# Patient Record
Sex: MALE | Race: OTHER | NOT HISPANIC OR LATINO | Employment: FULL TIME | URBAN - METROPOLITAN AREA
[De-identification: names, ages, dates, MRNs, and addresses within clinical notes are randomized per-mention and may not be internally consistent; named-entity substitution may affect disease eponyms.]

---

## 2019-11-05 ENCOUNTER — ANESTHESIA EVENT (OUTPATIENT)
Dept: PERIOP | Facility: HOSPITAL | Age: 64
End: 2019-11-05
Payer: COMMERCIAL

## 2019-11-05 RX ORDER — SODIUM CHLORIDE, SODIUM LACTATE, POTASSIUM CHLORIDE, CALCIUM CHLORIDE 600; 310; 30; 20 MG/100ML; MG/100ML; MG/100ML; MG/100ML
125 INJECTION, SOLUTION INTRAVENOUS CONTINUOUS
Status: CANCELLED | OUTPATIENT
Start: 2019-12-04

## 2019-11-06 ENCOUNTER — TRANSCRIBE ORDERS (OUTPATIENT)
Dept: ADMINISTRATIVE | Facility: HOSPITAL | Age: 64
End: 2019-11-06

## 2019-11-06 ENCOUNTER — HOSPITAL ENCOUNTER (OUTPATIENT)
Dept: NON INVASIVE DIAGNOSTICS | Facility: HOSPITAL | Age: 64
Discharge: HOME | End: 2019-11-06
Attending: ORTHOPAEDIC SURGERY
Payer: COMMERCIAL

## 2019-11-06 ENCOUNTER — HOSPITAL ENCOUNTER (OUTPATIENT)
Dept: RADIOLOGY | Facility: HOSPITAL | Age: 64
Discharge: HOME | End: 2019-11-06
Attending: ORTHOPAEDIC SURGERY
Payer: COMMERCIAL

## 2019-11-06 ENCOUNTER — APPOINTMENT (OUTPATIENT)
Dept: LAB | Facility: HOSPITAL | Age: 64
End: 2019-11-06
Attending: ORTHOPAEDIC SURGERY
Payer: COMMERCIAL

## 2019-11-06 ENCOUNTER — APPOINTMENT (OUTPATIENT)
Dept: PREADMISSION TESTING | Facility: HOSPITAL | Age: 64
End: 2019-11-06
Payer: COMMERCIAL

## 2019-11-06 DIAGNOSIS — Z01.818 OTHER SPECIFIED PRE-OPERATIVE EXAMINATION: ICD-10-CM

## 2019-11-06 DIAGNOSIS — M17.12 OSTEOARTHRITIS OF LEFT KNEE, UNSPECIFIED OSTEOARTHRITIS TYPE: ICD-10-CM

## 2019-11-06 DIAGNOSIS — Z01.818 OTHER SPECIFIED PRE-OPERATIVE EXAMINATION: Primary | ICD-10-CM

## 2019-11-06 LAB
ALBUMIN SERPL BCP-MCNC: 3.5 G/DL (ref 3.5–5)
ALP SERPL-CCNC: 94 U/L (ref 46–116)
ALT SERPL W P-5'-P-CCNC: 42 U/L (ref 12–78)
ANION GAP SERPL CALCULATED.3IONS-SCNC: 9 MMOL/L (ref 4–13)
AST SERPL W P-5'-P-CCNC: 29 U/L (ref 5–45)
BACTERIA UR QL AUTO: ABNORMAL /HPF
BASOPHILS # BLD AUTO: 0.06 THOUSANDS/ΜL (ref 0–0.1)
BASOPHILS NFR BLD AUTO: 1 % (ref 0–1)
BILIRUB SERPL-MCNC: 0.2 MG/DL (ref 0.2–1)
BILIRUB UR QL STRIP: NEGATIVE
BUN SERPL-MCNC: 16 MG/DL (ref 5–25)
CALCIUM SERPL-MCNC: 9.2 MG/DL (ref 8.3–10.1)
CHLORIDE SERPL-SCNC: 102 MMOL/L (ref 100–108)
CLARITY UR: CLEAR
CO2 SERPL-SCNC: 28 MMOL/L (ref 21–32)
COLOR UR: YELLOW
CREAT SERPL-MCNC: 0.95 MG/DL (ref 0.6–1.3)
EOSINOPHIL # BLD AUTO: 0.8 THOUSAND/ΜL (ref 0–0.61)
EOSINOPHIL NFR BLD AUTO: 9 % (ref 0–6)
ERYTHROCYTE [DISTWIDTH] IN BLOOD BY AUTOMATED COUNT: 12.8 % (ref 11.6–15.1)
GFR SERPL CREATININE-BSD FRML MDRD: 83 ML/MIN/1.73SQ M
GLUCOSE SERPL-MCNC: 99 MG/DL (ref 65–140)
GLUCOSE UR STRIP-MCNC: NEGATIVE MG/DL
HCT VFR BLD AUTO: 40.6 % (ref 36.5–49.3)
HGB BLD-MCNC: 13.5 G/DL (ref 12–17)
HGB UR QL STRIP.AUTO: ABNORMAL
IMM GRANULOCYTES # BLD AUTO: 0.05 THOUSAND/UL (ref 0–0.2)
IMM GRANULOCYTES NFR BLD AUTO: 1 % (ref 0–2)
KETONES UR STRIP-MCNC: NEGATIVE MG/DL
LEUKOCYTE ESTERASE UR QL STRIP: NEGATIVE
LYMPHOCYTES # BLD AUTO: 1.76 THOUSANDS/ΜL (ref 0.6–4.47)
LYMPHOCYTES NFR BLD AUTO: 20 % (ref 14–44)
MCH RBC QN AUTO: 31.7 PG (ref 26.8–34.3)
MCHC RBC AUTO-ENTMCNC: 33.3 G/DL (ref 31.4–37.4)
MCV RBC AUTO: 95 FL (ref 82–98)
MONOCYTES # BLD AUTO: 0.81 THOUSAND/ΜL (ref 0.17–1.22)
MONOCYTES NFR BLD AUTO: 9 % (ref 4–12)
NEUTROPHILS # BLD AUTO: 5.25 THOUSANDS/ΜL (ref 1.85–7.62)
NEUTS SEG NFR BLD AUTO: 60 % (ref 43–75)
NITRITE UR QL STRIP: NEGATIVE
NON-SQ EPI CELLS URNS QL MICRO: ABNORMAL /HPF
NRBC BLD AUTO-RTO: 0 /100 WBCS
PH UR STRIP.AUTO: 6 [PH]
PLATELET # BLD AUTO: 313 THOUSANDS/UL (ref 149–390)
PMV BLD AUTO: 9.1 FL (ref 8.9–12.7)
POTASSIUM SERPL-SCNC: 3.9 MMOL/L (ref 3.5–5.3)
PROT SERPL-MCNC: 6.9 G/DL (ref 6.4–8.2)
PROT UR STRIP-MCNC: NEGATIVE MG/DL
RBC # BLD AUTO: 4.26 MILLION/UL (ref 3.88–5.62)
RBC #/AREA URNS AUTO: ABNORMAL /HPF
SODIUM SERPL-SCNC: 139 MMOL/L (ref 136–145)
SP GR UR STRIP.AUTO: 1.02 (ref 1–1.03)
UROBILINOGEN UR QL STRIP.AUTO: 0.2 E.U./DL
WBC # BLD AUTO: 8.73 THOUSAND/UL (ref 4.31–10.16)
WBC #/AREA URNS AUTO: ABNORMAL /HPF

## 2019-11-06 PROCEDURE — 36415 COLL VENOUS BLD VENIPUNCTURE: CPT

## 2019-11-06 PROCEDURE — 81001 URINALYSIS AUTO W/SCOPE: CPT | Performed by: ORTHOPAEDIC SURGERY

## 2019-11-06 PROCEDURE — 80053 COMPREHEN METABOLIC PANEL: CPT

## 2019-11-06 PROCEDURE — 85025 COMPLETE CBC W/AUTO DIFF WBC: CPT

## 2019-11-06 PROCEDURE — 71046 X-RAY EXAM CHEST 2 VIEWS: CPT

## 2019-11-06 RX ORDER — MULTIVITAMIN
1 CAPSULE ORAL DAILY
COMMUNITY

## 2019-11-06 RX ORDER — EZETIMIBE AND SIMVASTATIN 10; 20 MG/1; MG/1
1 TABLET ORAL
COMMUNITY

## 2019-11-06 RX ORDER — ASPIRIN 81 MG/1
81 TABLET ORAL DAILY
COMMUNITY
End: 2019-12-06 | Stop reason: HOSPADM

## 2019-11-06 RX ORDER — LISINOPRIL 40 MG/1
40 TABLET ORAL DAILY
COMMUNITY

## 2019-11-06 NOTE — PRE-PROCEDURE INSTRUCTIONS
Pre-Surgery Instructions:   Medication Instructions    aspirin (ECOTRIN LOW STRENGTH) 81 mg EC tablet Patient was instructed by Physician and understands   bismuth subsalicylate (PEPTO BISMOL) 524 mg/30 mL oral suspension Instructed patient per Anesthesia Guidelines   ezetimibe-simvastatin (VYTORIN) 10-20 mg per tablet Instructed patient per Anesthesia Guidelines   lisinopril (ZESTRIL) 40 mg tablet Instructed patient per Anesthesia Guidelines   Multiple Vitamin (MULTIVITAMIN) capsule Instructed patient per Anesthesia Guidelines   oxyCODONE HCl 5 MG TABA Instructed patient per Anesthesia Guidelines     No meds needed am of surgery per anesthesia DR Luis Kimball,

## 2019-11-06 NOTE — ANESTHESIA PREPROCEDURE EVALUATION
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  Neg cath & ECHO     The left ventricular systolic function is normal   · Patient has normal LV systolic function  · Patient has normal LV end diastolic pressure  · The ejection fraction is greater than 55% by visual estimate  · Patient has mild coronary artery disease  · The left ventricular systolic function is normal   · Patient has normal LV systolic function  ,  Pulmonary  Smoker ex-smoker  ,        GI/Hepatic    GERD (mild) well controlled,        Negative  ROS        Endo/Other  Negative endo/other ROS      GYN  Negative gynecology ROS          Hematology  Negative hematology ROS      Musculoskeletal  Back pain , cervical pain,   Arthritis (S/P R THANH -Saul-last year)     Neurology  Negative neurology ROS      Psychology   Negative psychology ROS              Physical Exam    Airway    Mallampati score: II  TM Distance: >3 FB  Neck ROM: full     Dental   No notable dental hx     Cardiovascular  Rhythm: regular, Rate: normal, Cardiovascular exam normal    Pulmonary  Pulmonary exam normal Breath sounds clear to auscultation,     Other Findings        Anesthesia Plan  ASA Score- 2     Anesthesia Type- spinal with ASA Monitors  Additional Monitors:   Airway Plan:     Comment: Adductor canal block preop        Plan Factors-Patient not instructed to abstain from smoking on day of procedure  Patient did not smoke on day of surgery  Induction- intravenous  Postoperative Plan-     Informed Consent- Anesthetic plan and risks discussed with patient

## 2019-12-04 ENCOUNTER — ANESTHESIA (OUTPATIENT)
Dept: PERIOP | Facility: HOSPITAL | Age: 64
End: 2019-12-04
Payer: COMMERCIAL

## 2019-12-04 ENCOUNTER — HOSPITAL ENCOUNTER (OUTPATIENT)
Facility: HOSPITAL | Age: 64
Setting detail: OUTPATIENT SURGERY
Discharge: HOME | End: 2019-12-06
Attending: ORTHOPAEDIC SURGERY | Admitting: ORTHOPAEDIC SURGERY
Payer: COMMERCIAL

## 2019-12-04 ENCOUNTER — APPOINTMENT (OUTPATIENT)
Dept: RADIOLOGY | Facility: HOSPITAL | Age: 64
End: 2019-12-04
Payer: COMMERCIAL

## 2019-12-04 DIAGNOSIS — M17.12 PRIMARY OSTEOARTHRITIS OF LEFT KNEE: Primary | ICD-10-CM

## 2019-12-04 LAB
ABO GROUP BLD: NORMAL
BLD GP AB SCN SERPL QL: NEGATIVE
RH BLD: POSITIVE
SPECIMEN EXPIRATION DATE: NORMAL

## 2019-12-04 PROCEDURE — G8978 MOBILITY CURRENT STATUS: HCPCS

## 2019-12-04 PROCEDURE — 86850 RBC ANTIBODY SCREEN: CPT | Performed by: ORTHOPAEDIC SURGERY

## 2019-12-04 PROCEDURE — G8979 MOBILITY GOAL STATUS: HCPCS

## 2019-12-04 PROCEDURE — 97163 PT EVAL HIGH COMPLEX 45 MIN: CPT

## 2019-12-04 PROCEDURE — 73560 X-RAY EXAM OF KNEE 1 OR 2: CPT

## 2019-12-04 PROCEDURE — C1776 JOINT DEVICE (IMPLANTABLE): HCPCS | Performed by: ORTHOPAEDIC SURGERY

## 2019-12-04 PROCEDURE — C1713 ANCHOR/SCREW BN/BN,TIS/BN: HCPCS | Performed by: ORTHOPAEDIC SURGERY

## 2019-12-04 PROCEDURE — 86901 BLOOD TYPING SEROLOGIC RH(D): CPT | Performed by: ORTHOPAEDIC SURGERY

## 2019-12-04 PROCEDURE — 86900 BLOOD TYPING SEROLOGIC ABO: CPT | Performed by: ORTHOPAEDIC SURGERY

## 2019-12-04 DEVICE — ATTUNE KNEE SYSTEM TIBIAL BASE ROTATING PLATFORM SIZE 6 CEMENTED
Type: IMPLANTABLE DEVICE | Site: KNEE | Status: FUNCTIONAL
Brand: ATTUNE

## 2019-12-04 DEVICE — SMARTSET HIGH PERFORMANCE MV MEDIUM VISCOSITY BONE CEMENT 40G
Type: IMPLANTABLE DEVICE | Site: KNEE | Status: FUNCTIONAL
Brand: SMARTSET

## 2019-12-04 DEVICE — ATTUNE PATELLA MEDIALIZED ANATOMIC 35MM CEMENTED AOX
Type: IMPLANTABLE DEVICE | Site: KNEE | Status: FUNCTIONAL
Brand: ATTUNE

## 2019-12-04 DEVICE — ATTUNE KNEE SYSTEM FEMORAL POSTERIOR STABILIZED SIZE 6 LEFT CEMENTED
Type: IMPLANTABLE DEVICE | Site: KNEE | Status: FUNCTIONAL
Brand: ATTUNE

## 2019-12-04 DEVICE — ATTUNE KNEE SYSTEM TIBIAL INSERT ROTATING PLATFORM POSTERIOR STABILIZED 6 5MM AOX
Type: IMPLANTABLE DEVICE | Site: KNEE | Status: FUNCTIONAL
Brand: ATTUNE

## 2019-12-04 RX ORDER — OXYCODONE HYDROCHLORIDE 5 MG/1
5 TABLET ORAL EVERY 4 HOURS PRN
Status: DISCONTINUED | OUTPATIENT
Start: 2019-12-04 | End: 2019-12-06 | Stop reason: HOSPADM

## 2019-12-04 RX ORDER — LISINOPRIL 20 MG/1
40 TABLET ORAL DAILY
Status: DISCONTINUED | OUTPATIENT
Start: 2019-12-05 | End: 2019-12-06 | Stop reason: HOSPADM

## 2019-12-04 RX ORDER — ROPIVACAINE HYDROCHLORIDE 5 MG/ML
INJECTION, SOLUTION EPIDURAL; INFILTRATION; PERINEURAL AS NEEDED
Status: DISCONTINUED | OUTPATIENT
Start: 2019-12-04 | End: 2019-12-04 | Stop reason: SURG

## 2019-12-04 RX ORDER — SODIUM CHLORIDE 9 MG/ML
INJECTION, SOLUTION INTRAVENOUS CONTINUOUS PRN
Status: DISCONTINUED | OUTPATIENT
Start: 2019-12-04 | End: 2019-12-04 | Stop reason: SURG

## 2019-12-04 RX ORDER — HYDROMORPHONE HCL/PF 1 MG/ML
0.5 SYRINGE (ML) INJECTION EVERY 2 HOUR PRN
Status: DISCONTINUED | OUTPATIENT
Start: 2019-12-04 | End: 2019-12-06 | Stop reason: HOSPADM

## 2019-12-04 RX ORDER — BUPIVACAINE HYDROCHLORIDE 7.5 MG/ML
INJECTION, SOLUTION INTRASPINAL AS NEEDED
Status: DISCONTINUED | OUTPATIENT
Start: 2019-12-04 | End: 2019-12-04 | Stop reason: SURG

## 2019-12-04 RX ORDER — SODIUM CHLORIDE, SODIUM LACTATE, POTASSIUM CHLORIDE, CALCIUM CHLORIDE 600; 310; 30; 20 MG/100ML; MG/100ML; MG/100ML; MG/100ML
100 INJECTION, SOLUTION INTRAVENOUS CONTINUOUS
Status: DISCONTINUED | OUTPATIENT
Start: 2019-12-04 | End: 2019-12-06 | Stop reason: HOSPADM

## 2019-12-04 RX ORDER — KETOROLAC TROMETHAMINE 30 MG/ML
15 INJECTION, SOLUTION INTRAMUSCULAR; INTRAVENOUS EVERY 6 HOURS PRN
Status: DISCONTINUED | OUTPATIENT
Start: 2019-12-04 | End: 2019-12-06 | Stop reason: HOSPADM

## 2019-12-04 RX ORDER — DOCUSATE SODIUM 100 MG/1
100 CAPSULE, LIQUID FILLED ORAL 2 TIMES DAILY
Status: DISCONTINUED | OUTPATIENT
Start: 2019-12-04 | End: 2019-12-06 | Stop reason: HOSPADM

## 2019-12-04 RX ORDER — HYDRALAZINE HYDROCHLORIDE 20 MG/ML
5 INJECTION INTRAMUSCULAR; INTRAVENOUS EVERY 6 HOURS PRN
Status: DISCONTINUED | OUTPATIENT
Start: 2019-12-04 | End: 2019-12-06 | Stop reason: HOSPADM

## 2019-12-04 RX ORDER — FERROUS SULFATE 325(65) MG
325 TABLET ORAL
Status: DISCONTINUED | OUTPATIENT
Start: 2019-12-05 | End: 2019-12-06 | Stop reason: HOSPADM

## 2019-12-04 RX ORDER — MAGNESIUM HYDROXIDE 1200 MG/15ML
LIQUID ORAL AS NEEDED
Status: DISCONTINUED | OUTPATIENT
Start: 2019-12-04 | End: 2019-12-04 | Stop reason: HOSPADM

## 2019-12-04 RX ORDER — ONDANSETRON 2 MG/ML
4 INJECTION INTRAMUSCULAR; INTRAVENOUS EVERY 8 HOURS PRN
Status: DISCONTINUED | OUTPATIENT
Start: 2019-12-04 | End: 2019-12-06 | Stop reason: HOSPADM

## 2019-12-04 RX ORDER — SENNOSIDES 8.6 MG
1 TABLET ORAL DAILY
Status: DISCONTINUED | OUTPATIENT
Start: 2019-12-04 | End: 2019-12-06 | Stop reason: HOSPADM

## 2019-12-04 RX ORDER — CEFAZOLIN SODIUM 2 G/50ML
2000 SOLUTION INTRAVENOUS ONCE
Status: DISCONTINUED | OUTPATIENT
Start: 2019-12-04 | End: 2019-12-04 | Stop reason: HOSPADM

## 2019-12-04 RX ORDER — CEFAZOLIN SODIUM 1 G/50ML
1000 SOLUTION INTRAVENOUS EVERY 8 HOURS
Status: COMPLETED | OUTPATIENT
Start: 2019-12-04 | End: 2019-12-05

## 2019-12-04 RX ORDER — OXYCODONE HYDROCHLORIDE 5 MG/1
10 TABLET ORAL EVERY 4 HOURS PRN
Status: DISCONTINUED | OUTPATIENT
Start: 2019-12-04 | End: 2019-12-06 | Stop reason: HOSPADM

## 2019-12-04 RX ORDER — CEFAZOLIN SODIUM 2 G/50ML
SOLUTION INTRAVENOUS AS NEEDED
Status: DISCONTINUED | OUTPATIENT
Start: 2019-12-04 | End: 2019-12-04 | Stop reason: SURG

## 2019-12-04 RX ORDER — MIDAZOLAM HYDROCHLORIDE 2 MG/2ML
INJECTION, SOLUTION INTRAMUSCULAR; INTRAVENOUS AS NEEDED
Status: DISCONTINUED | OUTPATIENT
Start: 2019-12-04 | End: 2019-12-04 | Stop reason: SURG

## 2019-12-04 RX ORDER — SODIUM CHLORIDE, SODIUM LACTATE, POTASSIUM CHLORIDE, CALCIUM CHLORIDE 600; 310; 30; 20 MG/100ML; MG/100ML; MG/100ML; MG/100ML
125 INJECTION, SOLUTION INTRAVENOUS CONTINUOUS
Status: DISCONTINUED | OUTPATIENT
Start: 2019-12-04 | End: 2019-12-06 | Stop reason: HOSPADM

## 2019-12-04 RX ORDER — PROPOFOL 10 MG/ML
INJECTION, EMULSION INTRAVENOUS CONTINUOUS PRN
Status: DISCONTINUED | OUTPATIENT
Start: 2019-12-04 | End: 2019-12-04 | Stop reason: SURG

## 2019-12-04 RX ORDER — FENTANYL CITRATE 50 UG/ML
INJECTION, SOLUTION INTRAMUSCULAR; INTRAVENOUS AS NEEDED
Status: DISCONTINUED | OUTPATIENT
Start: 2019-12-04 | End: 2019-12-04 | Stop reason: SURG

## 2019-12-04 RX ORDER — ASPIRIN 325 MG
325 TABLET ORAL 2 TIMES DAILY
Status: DISCONTINUED | OUTPATIENT
Start: 2019-12-04 | End: 2019-12-06 | Stop reason: HOSPADM

## 2019-12-04 RX ORDER — ACETAMINOPHEN 325 MG/1
650 TABLET ORAL EVERY 6 HOURS PRN
Status: DISCONTINUED | OUTPATIENT
Start: 2019-12-04 | End: 2019-12-06 | Stop reason: HOSPADM

## 2019-12-04 RX ORDER — ONDANSETRON 2 MG/ML
4 INJECTION INTRAMUSCULAR; INTRAVENOUS ONCE AS NEEDED
Status: DISCONTINUED | OUTPATIENT
Start: 2019-12-04 | End: 2019-12-04 | Stop reason: HOSPADM

## 2019-12-04 RX ORDER — FENTANYL CITRATE/PF 50 MCG/ML
50 SYRINGE (ML) INJECTION
Status: DISCONTINUED | OUTPATIENT
Start: 2019-12-04 | End: 2019-12-04 | Stop reason: HOSPADM

## 2019-12-04 RX ADMIN — HYDRALAZINE HYDROCHLORIDE 5 MG: 20 INJECTION INTRAMUSCULAR; INTRAVENOUS at 17:40

## 2019-12-04 RX ADMIN — BUPIVACAINE HYDROCHLORIDE IN DEXTROSE 1.8 ML: 7.5 INJECTION, SOLUTION SUBARACHNOID at 10:50

## 2019-12-04 RX ADMIN — EZETIMIBE: 10 TABLET ORAL at 21:32

## 2019-12-04 RX ADMIN — SODIUM CHLORIDE, SODIUM LACTATE, POTASSIUM CHLORIDE, AND CALCIUM CHLORIDE 125 ML/HR: .6; .31; .03; .02 INJECTION, SOLUTION INTRAVENOUS at 09:31

## 2019-12-04 RX ADMIN — HYDROMORPHONE HYDROCHLORIDE 0.5 MG: 1 INJECTION, SOLUTION INTRAMUSCULAR; INTRAVENOUS; SUBCUTANEOUS at 16:22

## 2019-12-04 RX ADMIN — FENTANYL CITRATE 100 MCG: 50 INJECTION, SOLUTION INTRAMUSCULAR; INTRAVENOUS at 10:26

## 2019-12-04 RX ADMIN — MIDAZOLAM 2 MG: 1 INJECTION INTRAMUSCULAR; INTRAVENOUS at 10:26

## 2019-12-04 RX ADMIN — CEFAZOLIN SODIUM 2000 MG: 2 SOLUTION INTRAVENOUS at 10:57

## 2019-12-04 RX ADMIN — DOCUSATE SODIUM 100 MG: 100 CAPSULE, LIQUID FILLED ORAL at 17:41

## 2019-12-04 RX ADMIN — ROPIVACAINE HYDROCHLORIDE 30 ML: 5 INJECTION, SOLUTION EPIDURAL; INFILTRATION; PERINEURAL at 10:29

## 2019-12-04 RX ADMIN — OXYCODONE HYDROCHLORIDE 10 MG: 5 TABLET ORAL at 19:16

## 2019-12-04 RX ADMIN — PROPOFOL 150 MCG/KG/MIN: 10 INJECTION, EMULSION INTRAVENOUS at 10:54

## 2019-12-04 RX ADMIN — HYDROMORPHONE HYDROCHLORIDE 0.5 MG: 1 INJECTION, SOLUTION INTRAMUSCULAR; INTRAVENOUS; SUBCUTANEOUS at 18:29

## 2019-12-04 RX ADMIN — OXYCODONE HYDROCHLORIDE 10 MG: 5 TABLET ORAL at 14:48

## 2019-12-04 RX ADMIN — OXYCODONE HYDROCHLORIDE 10 MG: 5 TABLET ORAL at 23:43

## 2019-12-04 RX ADMIN — SODIUM CHLORIDE: 0.9 INJECTION, SOLUTION INTRAVENOUS at 11:39

## 2019-12-04 RX ADMIN — ASPIRIN 325 MG ORAL TABLET 325 MG: 325 PILL ORAL at 17:41

## 2019-12-04 RX ADMIN — CEFAZOLIN SODIUM 1000 MG: 1 SOLUTION INTRAVENOUS at 18:29

## 2019-12-04 RX ADMIN — HYDROMORPHONE HYDROCHLORIDE 0.5 MG: 1 INJECTION, SOLUTION INTRAMUSCULAR; INTRAVENOUS; SUBCUTANEOUS at 21:16

## 2019-12-04 RX ADMIN — SODIUM CHLORIDE, SODIUM LACTATE, POTASSIUM CHLORIDE, AND CALCIUM CHLORIDE 100 ML/HR: .6; .31; .03; .02 INJECTION, SOLUTION INTRAVENOUS at 13:31

## 2019-12-04 NOTE — PHYSICAL THERAPY NOTE
PHYSICAL THERAPY EVALUATION          Patient Name: Pardeep Burns  [id number] Date: 12/4/2019     75 y o   [id number]    Unilateral primary osteoarthritis, left knee [M17 12]    Past Medical History:   Diagnosis Date    Cervical stenosis of spine     Hyperlipidemia     Hypertension     Mild acid reflux     Myocarditis ([address] )     sept 2019    OA (osteoarthritis)     left knee    Seasonal allergies        Past Surgical History:   Procedure Laterality Date    APPENDECTOMY      COLONOSCOPY      JOINT REPLACEMENT      right TKR    KNEE ARTHROSCOPY Bilateral     VASECTOMY          12/04/19 1605   Note Type   Note type Eval only   Pain Assessment   Pain Assessment 0-10   Pain Score Worst Possible Pain   Pain Type Surgical pain   Pain Location Knee   Pain Orientation Left   Hospital Pain Intervention(s) Cold applied;Repositioned; Ambulation/increased activity; Elevated   Response to Interventions tolerated   Home Living   Type of Home House  (ranch style)   Home Layout One level;Stairs to enter with rails  (3 HUGO)   Bathroom Shower/Tub Tub/shower unit   Bathroom Toilet Standard   Bathroom Accessibility Accessible   Home Equipment Walker;Cane   Prior Function   Level of [place] Independent with ADLs and functional mobility   Lives With Spouse   Receives Help From Family   ADL Assistance Independent   IADLs Independent   Falls in the last 6 months 0   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[employer])   Comments pt prev indep and amb w/o an AD  wife available to assist prn   Restrictions/Precautions   Weight Bearing Precautions Per Order Yes   LLE Weight Bearing Per Order WBAT   Other Precautions WBS; Multiple lines; Fall Risk;Pain   General   Additional Pertinent History s/p L TKA 12/4/19   Family/Caregiver Present Yes  (wife)   Cognition   Overall Cognitive Status [facility]   Arousal/Participation Alert   Orientation Level Oriented X4   Memory Within functional limits   Following Commands Follows all commands and directions without difficulty   RUE Assessment   RUE Assessment WFL   LUE Assessment   LUE Assessment WFL   RLE Assessment   RLE Assessment WFL   LLE Assessment   LLE Assessment X  (knee AROM limited by pain)   Strength LLE   L Knee Extension 3-/5   L Knee Flexion 3-/5   Coordination   Movements are Fluid and Coordinated 0   Sensation WFL   Light Touch   RLE Light Touch Grossly intact   LLE Light Touch Grossly intact   Bed Mobility   Supine to Sit 4  Minimal assistance   Additional items HOB elevated; Bedrails; Increased time required;LE management;Verbal cues   Sit to Supine 4  Minimal assistance   Additional items Assist x 1; Increased time required;LE management;Verbal cues   Transfers   Sit to Stand 4  Minimal assistance   Additional items Assist x 1; Increased time required;Verbal cues   Stand to Sit 4  Minimal assistance   Additional items Assist x 1; Increased time required;Verbal cues   Additional Comments vc for technique and hand placement; use of RW for support   Ambulation/Elevation   Gait pattern Antalgic;Narrow EMMA; Forward Flexion;Decreased foot clearance;Decreased L stance; Short stride; Excessively slow; Step to   Gait Assistance 4  Minimal assist   Additional items Assist x 1;Verbal cues   Assistive Device Rolling walker   Distance 3'   Stair Management Assistance Not tested   Balance   Dynamic Sitting Fair   Static Standing Fair -  (w RW)   Ambulatory Poor +  (w RW)   Endurance Deficit   Endurance Deficit Yes   Endurance Deficit Description pt limited by pain   Activity Tolerance   Activity Tolerance Patient limited by pain   Nurse Made Aware Shalom Montgomery RN; cleared for PT   Assessment   Prognosis Good   Problem List Decreased range of motion;Decreased strength;Decreased endurance; Impaired balance;Decreased mobility; Decreased coordination;Orthopedic restrictions;Decreased skin integrity;Pain   Assessment Michael Echevarria is a 75 y o  male admitted to [facility] on 12/4/2019 for Osteoarthrosis, localized, primary, knee, left  S/p L TKA 12/4/19  Pt  has a past medical history of Cervical stenosis of spine, Hyperlipidemia, Hypertension, Mild acid reflux, Myocarditis ([address] ), OA (osteoarthritis), and Seasonal allergies    PT was consulted and pt was seen on 12/4/2019 for a high complexity PT EVALUATION  Pt presents with monitoring of abn vitals, PIV, incision of L knee, catheterization, WBAT LLE  Pt lives with wife in a ranch style home with 3 HUGO and first floor bed and bath  Prior to admission pt was indep and amb w/o an AD  Pt is currently functioning at a minimum assistance x1 level for bed mobility, minimum assistance x1 level for transfers, minimum assistance x1 level for ambulation with Rolling Walker  Pt demonstrated assist for LLE during bed mobility, verbal cues for hand placement and technique during transfers, decreased stride w step to gait pattern during ambulation  Upon sitting EOB pt reported feeling lightheaded w increased pain; vitals recorded as 191/93  Pt report decrease in sx w seated rest  Pt also instructed on post op HEP including quad sets, heel slides and ankle pumps; pt verbalize understanding  Pt inquiring about Leg lift as he had one follow R TKA in 2018 and thought it was useful to perform bed mobility Indep  Pt will benefit from continued skilled IP PT to address the above mentioned impairments  in order to maximize recovery and increase functional independence when completing mobility and ADLs  Currently PT recommendations for DME include leg lift per pt request  At this time PT recommendations for d/c are home w family support and HHPT  Barriers to Discharge Inaccessible home environment   Barriers to Discharge Comments HUGO   Goals   Patient Goals decrease pain   STG Expiration Date 12/07/19   Short Term Goal #1 To be completed in 3 days: 1)    Pt will perform bed mobility with Jose G demonstrating appropriate technique 100% of the time in order to improve function  2)  Perform all transfers with Jose G demonstrating safe and appropriate technique 100% of the time in order to improve ability to negotiate safely in home environment  3) Amb with least restrictive AD > 200'x1 with mod I in order to demonstrate ability to negotiate in home environment  4)  Improve overall strength and balance 1/2 grade in order to optimize ability to perform functional tasks and reduce fall risk  5) Increase activity tolerance to 45 minutes in order to improve endurance to functional tasks  6)  Negotiate stairs using most appropriate technique and S in order to be able to negotiate safely in home environment  7) PT for ongoing patient and family/caregiver education, DME needs and d/c planning in order to promote highest level of function in least restrictive environment  PT Treatment Day 0   Plan   Treatment/Interventions Functional transfer training;LE strengthening/ROM; Elevations; Therapeutic exercise; Endurance training;Patient/family training;Equipment eval/education;Gait training;Bed mobility; Compensatory technique education;Spoke to nursing   PT Frequency Twice a day;7x/wk; Weekend   Recommendation   Recommendation Home with family support;Home PT   PT - OK to Discharge Yes   Additional Comments when medically cleared   Modified [place] Scale   Modified [place] Scale 4   Barthel Index   Feeding 10   Bathing 0   Grooming Score 5   Dressing Score 5   Bladder Score 0   Bowels Score 10   Toilet Use Score 5   Transfers (Bed/Chair) Score 10   Mobility (Level Surface) Score 0   Stairs Score 0   Barthel Index Score 45   History: co - morbidities, social background (environmental barriers), fall risk, use of assistive device, assist for adl's, multiple lines  Exam: impairments in systems including musculoskeletal (ROM, strength, posture), neuromuscular (balance,locomotion, gait, transfers, motor function and learning), joint integrity, integumentary (skin integrity, presence of scars or wounds), cardiopulmonary, cognition  Clinical: unstable/unpredictable  Complexity:high      Bri Pineda, PT

## 2019-12-04 NOTE — ANESTHESIA PROCEDURE NOTES
Spinal Block    Patient location during procedure: OR  Start time: 12/4/2019 10:50 AM  Reason for block: primary anesthetic  Staffing  Anesthesiologist: Jessica Bruce DO  Performed: anesthesiologist   Preanesthetic Checklist  Completed: patient identified, site marked, surgical consent, pre-op evaluation, timeout performed, IV checked, risks and benefits discussed and monitors and equipment checked  Spinal Block  Patient position: sitting  Prep: Betadine  Patient monitoring: heart rate, continuous pulse ox and frequent blood pressure checks  Approach: midline  Location: L3-4  Injection technique: single-shot  Needle  Needle type: pencil-tip   Needle gauge: 24 G  Needle length: 10 cm  Assessment  Injection Assessment:  positive aspiration for clear CSF, no paresthesia on injection and negative aspiration for heme    Post-procedure:  site cleaned

## 2019-12-04 NOTE — OP NOTE
Left Total Knee Procedure Note    Patient Name: Trino Nguyen  MRN: [id number]  Date of Surgery 12/4/2019    Surgeon: Bailey Larose MD  Assistant: Eric Cervantes [facility]    Indications: Degenerative joint disease left knee with failed conservative care  Pre-operative Diagnosis: Left knee degenerative joint disease  Post-operative Diagnosis: Left knee degenerative joint disease  Anesthesia:  Choice  Operative procedure: Left total knee arthroplasty    Implants:   Implant Name Type Inv  Item Serial No   Lot No  LRB No  Used   CEMENT BONE SMART SET GRAY MED VISC - [id number]  CEMENT BONE SMART SET GRAY MED VISC  DEPUY [id number] Left 1   COMPONENT FEM SZ 6 LT  CMNT PS ATTUNE - [id number]  COMPONENT FEM SZ 6 LT  CMNT PS ATTUNE  DEPUY [id number] Left 1   INSERT TIBIAL 5MM SZ 6 PS ATTUNE - [id number]  INSERT TIBIAL 5MM SZ 6 PS ATTUNE  DEPUY [id number] Left 1   BASEPLATE TIBIAL SZ 6 CMNT ROTPLT ATTUNE KNEE SYSTM - [id number]  BASEPLATE TIBIAL SZ 6 CMNT ROTPLT ATTUNE KNEE SYSTM  DEPUY [id number] Left 1   COMPONENT PATELLAR 35MM CMNT ATTUNE - [id number]  COMPONENT PATELLAR 35MM CMNT ATTUNE  DEPUY [id number] Left 1       Tourniquet time: 41 minutes at 250 mmHg    Drains: none    Estimated blood loss: 75 cc    Antibiotics: Given preoperatively    Clinical note: Trino Nguyen is a 75 y o  male who presents with severe left knee pain and disability  Physical exam investigations was consistent with degenerative joint disease  The patient been treated nonoperatively and failed to improve  Nonoperative versus operative options reviewed  The patient did understand the situation and did wish to proceed with operative management    Description of procedure: The patient was identified as Trino Nguyen and the left knee as the surgical site  Anesthesia was administered  The patient's left lower extremity was elevated and tourniquet applied to the proximal thigh    The left lower extremity was then prepped and free draped in usual fashion for knee surgery  Utilizing an Esmarch bandage, the left lower extremity was stripped followed by inflation of tourniquet  A medial parapatellar approach was made and sharp dissection was carried down through skin and subcutaneous tissue  A medial parapatellar arthrotomy was performed the patella was everted and the knee was flexed  End-stage degenerative changes within the left knee were identified  The cruciate ligament were divided  Utilizing an external tibial cutting guide, the tibial cut was made  The menisci were removed  The femoral cuts were then made utilizing the intramedullary guide system and appropriate cutting jigs  Flexion and extension gaps were found to be satisfactory and the tibia was then machined to accept the tibial component  A trial reduction was carried out and the knee was found to be stable and went through satisfactory range of motion  The patella was cut with freehand technique and appropriately sized followed by placement of a trial component  The patella was noted to track normally and all trial components removed  The knee was then copiously irrigated with saline solution followed by cementing a final components in place starting with the tibia followed by femoral component  The tibial bearing was inserted and knee was held in extension followed by cementing of the patellar component holding it in place with a patellar clamp  Excess cement was removed  The knee was held in extension until the cement cured  The knee was then checked for range of motion and found to be excellent with excellent stability  The tourniquet was released and hemostasis was obtained  2 drains were placed in the wound, one deep and one superficial which would lead to a reinfusion system  The incision was then closed in layers utilizing #1 Vicryl for deep layers, 2-0 Vicryl for subcutaneous closure and a 3-0 Monocryl subcuticular stitch for skin  Steri-Strips were applied  A soft absorbent bandage and Ace wrap was added  The patient be transferred to a stretcher in the supine position and taken to recovery  There were no complications  Throughout the procedure, assistance by Etienne Coleman PA-C was required  She was required to aid in positioning the patient preoperatively and intraoperatively she was required to manipulate the patient's left lower extremity as well as various retractors and other equipment under my guidance  On completion of procedure, she was required to aid in closure of the wound and application of bandage  She was also required to aid in transfer the patient to recovery  AP and lateral views of the left knee were obtained in recovery  This demonstrated appropriate positioning total knee arthroplasty components  There was no evidence loosening or failure  There was air in the soft tissues consistent with immediate postoperative status the radiographs  2 drains were seen within the wound        Jeimy Narayanan MD      Date: 12/4/2019  Time: 12:58 PM

## 2019-12-04 NOTE — ANESTHESIA PROCEDURE NOTES
Shave Biopsy Wound Care    Your doctor has performed a shave biopsy today.  A band aid and vaseline ointment has been placed over the site.  This should remain in place for 24 hours.  It is recommended that you keep the area dry for the first 24 hours.  After 24 hours, you may remove the band aid and wash the area with warm soap and water and apply Vaseline jelly.  Many patients prefer to use Neosporin or Bacitracin ointment.  This is acceptable; however, know that you can develop an allergy to this medication even if you have used it safely for years.  It is important to keep the area moist.  Letting it dry out and get air slows healing time, and will worsen the scar.  Band aid is optional after first 24 hours.      If you notice increasing redness, tenderness, pain, or yellow drainage at the biopsy site, please notify your doctor.  These are signs of an infection.    If your biopsy site is bleeding, apply firm pressure for 15 minutes straight.  Repeat for another 15 minutes, if it is still bleeding.   If the surgical site continues to bleed, then please contact your doctor.      [facility]   DERMATOLOGY  [address]   [place] [address]   Dept: [phone]   Dept Fax: [phone]          Peripheral Block    Patient location during procedure: holding area  Start time: 12/4/2019 10:26 AM  Reason for block: post-op pain management  Staffing  Anesthesiologist: Genia Joseph, DO  Peripheral Block  Patient position: supine  Prep: ChloraPrep  Patient monitoring: heart rate, continuous pulse ox and frequent blood pressure checks  Block type: adductor canal block  Laterality: left  Injection technique: single-shot  Procedures: ultrasound guided, Ultrasound guidance required for the procedure to increase accuracy and safety of medication placement and decrease risk of complications    Ultrasound permanent image saved  Needle  Needle type: Stimuplex   Needle gauge: 21 G  Needle length: 10 cm  Needle localization: anatomical landmarks and ultrasound guidance  Test dose: negative  Assessment  Injection assessment: incremental injection, local visualized surrounding nerve on ultrasound, negative aspiration for heme and no paresthesia on injection  Paresthesia pain: none  Heart rate change: no  Slow fractionated injection: yes  Post-procedure:  site cleaned

## 2019-12-04 NOTE — PLAN OF CARE
Problem: PHYSICAL THERAPY ADULT  Goal: Performs mobility at highest level of function for planned discharge setting  See evaluation for individualized goals  Description  Treatment/Interventions: Functional transfer training, LE strengthening/ROM, Elevations, Therapeutic exercise, Endurance training, Patient/family training, Equipment eval/education, Gait training, Bed mobility, Compensatory technique education, Spoke to nursing          See flowsheet documentation for full assessment, interventions and recommendations  Note:   Prognosis: Good  Problem List: Decreased range of motion, Decreased strength, Decreased endurance, Impaired balance, Decreased mobility, Decreased coordination, Orthopedic restrictions, Decreased skin integrity, Pain  Assessment: Frankey Pleas is a 75 y o  male admitted to [facility] on 12/4/2019 for Osteoarthrosis, localized, primary, knee, left  S/p L TKA 12/4/19  Pt  has a past medical history of Cervical stenosis of spine, Hyperlipidemia, Hypertension, Mild acid reflux, Myocarditis ([address] ), OA (osteoarthritis), and Seasonal allergies    PT was consulted and pt was seen on 12/4/2019 for a high complexity PT EVALUATION  Pt presents with monitoring of abn vitals, PIV, incision of L knee, catheterization, WBAT LLE  Pt lives with wife in a ranch style home with 3 HUGO and first floor bed and bath  Prior to admission pt was indep and amb w/o an AD  Pt is currently functioning at a minimum assistance x1 level for bed mobility, minimum assistance x1 level for transfers, minimum assistance x1 level for ambulation with Rolling Walker  Pt demonstrated assist for LLE during bed mobility, verbal cues for hand placement and technique during transfers, decreased stride w step to gait pattern during ambulation  Upon sitting EOB pt reported feeling lightheaded w increased pain; vitals recorded as 191/93   Pt report decrease in sx w seated rest  Pt also instructed on post op HEP including quad sets, heel slides and ankle pumps; pt verbalize understanding  Pt inquiring about Leg lift as he had one follow R TKA in 2018 and thought it was useful to perform bed mobility Indep  Pt will benefit from continued skilled IP PT to address the above mentioned impairments  in order to maximize recovery and increase functional independence when completing mobility and ADLs  Currently PT recommendations for DME include leg lift per pt request  At this time PT recommendations for d/c are home w family support and HHPT  Barriers to Discharge: Inaccessible home environment  Barriers to Discharge Comments: HUGO  Recommendation: Home with family support, Home PT     PT - OK to Discharge: Yes    See flowsheet documentation for full assessment

## 2019-12-04 NOTE — CONSULTS
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       Recommendation: -    Patient recuperating well following his surgery as expected  He has mild hypertension which is poly factorial but likely due to not taking his medicine this morning  Will resume his lisinopril at his normal dose  If his blood pressure is significantly elevated, greater than 497 systolic then perhaps can try low-dose IV antihypertensives  I have reviewed patients medications as initiated on post operative orders by the primary team  Recommend  monitoring closely for any hypoxemia / respiratory insufficieny related to narcotics and to reduce doses and frequency of narcotics if necessary  Resume patients home medications and I have reviewed them  Maintain Intravenous Fluids for next 24  hours, till patient able to reliably keep meals and meds in  Suggest  Zofran ODT 4 mg sublingually PRN for control of post operative nausea  Patient encouraged to  use Incentive spirometry q 15 minutes while awake to minimize risk of postoperative atelectasis and pneumonia  Patient verbalized to understand and fully comprehend  Recommend DVT prophylaxis with use of Venodyne compression boots  Continue aspirin per primary service  Repeat lab work in the morning to ensure hemoglobin remains stable  If any symptoms of Cardiology related issues then will place him on telemetric evaluation      We will follow this pleasant patient with your service closely and recommend necessary changes based on  further hospital course and diagnostics  Thank you, Dr Barb Ch , for your kind consultation  HISTORY OF PRESENT ILLNESS:    Reason for Consult:  Post operative management for elective left knee replacement    HPI: Lola Barker is a 75 y o  male who underwent elective left knee replacement earlier today by Dr Barb Ch  Patient had a previous right knee replacement without any complications  He has advanced osteoarthritis involving his left knee which has been treated with various conservative treatments and have eventually failed  He was having severe pain 10/10 limiting his activities of daily living  He was planning to undergo surgery when about September of this year he had an episode of chest pain and was diagnosed to have myocarditis  He has been followed by Dr Ember Young at [facility] and was cleared for the surgery on 09/17/2019  Thereafter he also has been followed by his primary care physician and had preoperative evaluation done on 11/12/2019  Currently he is recuperating well without any symptoms or chest pain palpitation diaphoresis  He has no bleeding from his surgical site  Patient has underlying history of hypertension and did not take his lisinopril earlier this morning  Review of Systems   Constitutional:   No appetite change, denies chills, diaphoresis, fatigue or fever  HEENT:  Negative sore throat and tinnitus  No visual complaints  Respiratory:  Negative chest tightness, shortness of breath and wheezing  Cardiovascular:  Negative for chest pain and palpitations  Gastrointestinal: Negative for abdominal distention or pain, constipation, diarrhea and nausea  Endocrine: Negative for cold intolerance, heat intolerance, polydipsia and polyuria  Skin:   Negative for color change, pallor and rash  Neurological:   Negative for dizziness, tremors, seizures, syncope, facial asymmetry, speech difficulty, weakness, light-headedness, numbness and headaches  Hematological:  Musculoskeletal:  Psychiatric:  No h/o spontaneous bruising/bleeding  Joint pains as above  Negative for agitation, behavioral problems, confusion, decreased concentration, dysphoric mood and sleep disturbance  A complete system-based review of systems as discussed with patient is otherwise negative  PAST MEDICAL HISTORY:  Past Medical History:   Diagnosis Date    Cervical stenosis of spine     Hyperlipidemia     Hypertension     Mild acid reflux     Myocarditis ([facility] [address] )     2019    OA (osteoarthritis)     left knee    Seasonal allergies      Past Surgical History:   Procedure Laterality Date    APPENDECTOMY      COLONOSCOPY      JOINT REPLACEMENT      right TKR    KNEE ARTHROSCOPY Bilateral     VASECTOMY         FAMILY HISTORY:  Significant for heart disease in mother, she also suffered with stroke  Father had heart disease and a stroke  One brother had coronary artery bypass grafting  SOCIAL HISTORY:  Social History     Substance and Sexual Activity   Alcohol Use Yes    Frequency: 4 or more times a week    Drinks per session: 3 or 4    Binge frequency: Less than monthly    Comment: beer     Social History     Substance and Sexual Activity   Drug Use Never     Social History     Tobacco Use   Smoking Status Former Smoker    Last attempt to quit: 1987    Years since quittin 0   Smokeless Tobacco Never Used       ALLERGIES:  No Known Allergies    MEDICATIONS:  All current active medications have been reviewed    Current Facility-Administered Medications   Medication Dose Route Frequency Provider Last Rate Last Dose    acetaminophen (TYLENOL) tablet 650 mg  650 mg Oral Q6H PRN Ester Benitez PA-C        aspirin tablet 325 mg  325 mg Oral BID Ester Benitez PA-C        ceFAZolin (ANCEF) IVPB (premix) 1,000 mg  1,000 mg Intravenous Q8H Ester Benitez PA-C        docusate sodium (COLACE) capsule 100 mg  100 mg Oral BID Ester Benitez PA-C       [facility] ezetimibe 10 mg-pravastatin 40 mg combo dose   Oral HS Nehal Santiago MD        [START ON 2019] ferrous sulfate tablet 325 mg  325 mg Oral Daily With Breakfast Wilcox Pop, PA-C        HYDROmorphone (DILAUDID) injection 0 5 mg  0 5 mg Intravenous Q2H PRN Wilcox Pop, PA-C   0 5 mg at 19 1622    ketorolac (TORADOL) injection 15 mg  15 mg Intravenous Q6H PRN Wilcox Pop, PA-C        lactated ringers infusion  100 mL/hr Intravenous Continuous Wilcox Pop, PA-C 100 mL/hr at 19 1331 100 mL/hr at 19 1331    lactated ringers infusion  125 mL/hr Intravenous Continuous Jimmye    Stopped at 19 1139    [START ON 2019] lisinopril (ZESTRIL) tablet 40 mg  40 mg Oral Daily Nehal Santiago MD        ondansetron [facility]) injection 4 mg  4 mg Intravenous Q8H PRN Wilcox Pop, PA-C        oxyCODONE (ROXICODONE) IR tablet 10 mg  10 mg Oral Q4H PRN Wilcox Pop, PA-C   10 mg at 19 1448    oxyCODONE (ROXICODONE) IR tablet 5 mg  5 mg Oral Q4H PRN Wilcox Pop, PA-C        senna (SENOKOT) tablet 8 6 mg  1 tablet Oral Daily Wilcox Pop, PA-C           Medications Prior to Admission   Medication    aspirin (ECOTRIN LOW STRENGTH) 81 mg EC tablet    bismuth subsalicylate (PEPTO BISMOL) 524 mg/30 mL oral suspension    ezetimibe-simvastatin (VYTORIN) 10-20 mg per tablet    lisinopril (ZESTRIL) 40 mg tablet    Multiple Vitamin (MULTIVITAMIN) capsule    oxyCODONE HCl 5 MG TABA           PHYSICAL EXAM:    Vitals:  Temp:  [96 1 °F (35 6 °C)-97 9 °F (36 6 °C)] 97 4 °F (36 3 °C)  HR:  [68-86] 86  Resp:  [15-21] 16  BP: (114-186)/() 186/102  SpO2:  [97 %-100 %] 98 %  Temp (24hrs), Av 3 °F (36 3 °C), Min:96 1 °F (35 6 °C), Max:97 9 °F (36 6 °C)  Current: Temperature: (!) 97 4 °F (36 3 °C)  Body mass index is 28 82 kg/m²  General Appearance:    Awake, alert, cooperative, no distress, appears of stated age     Head:    Normocephalic, without obvious abnormality, atraumatic   Eyes:    Pupils equal in size,conjunctiva/corneas clear, EOM's intact  Ears:    External ear no drainage or redness  Nose:   No evidence of epistaxis/ discharge from nares  Throat:   Lips, mucosa, and tongue normal    Neck:   Supple, symmetrical, trachea midline, no JVP  Back:     Symmetric, no curvature, no CVA tenderness   Lungs:     Bilateral air entry is broncho-alveolar and equal        No crepitation or rales  No pleural rub  Heart:    Regular rate and rhythm, S1S2 normal, no murmur, rub  Abdomen:     Soft, non-tender, no masses, no organomegaly   Musculoskeletal:   Extremities appear normal, atraumatic, no cyanosis or edema  Surgical site on the operated left knee has clear dressing / no bleeding or hemorrhage apparent  Previous well-healed surgical scar helen on the right knee  Bilateral Chalo stockings present   Vascular:   2+ in Posterior tibialis / dorsalis pedis  Skin:   Skin color, texture, turgor normal, no rashes or lesions   Neurologic:   Oriented/ Awake/ facial symmetry maintained  Speech is intact  Muscle bulk and strength is equivocal in B/L Upper and lower extremities except on operated left lower limb  Light sensation is intact B/l LE  B/L Planta flexion is WNL  LABS, IMAGING, & OTHER STUDIES:  Lab Results:  I have personally reviewed pertinent labs  Lab Results   Component Value Date     11/06/2019    CO2 28 11/06/2019    BUN 16 11/06/2019    CREATININE 0 95 11/06/2019    EGFR 83 11/06/2019    CALCIUM 9 2 11/06/2019    AST 29 11/06/2019    ALT 42 11/06/2019    ALKPHOS 94 11/06/2019     Lab Results   Component Value Date    WBC 8 73 11/06/2019    HGB 13 5 11/06/2019     11/06/2019       No results found for: INR, HGBA1C      Imaging Studies:   I have personally reviewed pertinent imaging study reports       Imaging:     Xr Chest Pa & Lateral    Result Date: 11/7/2019  Narrative: CHEST INDICATION:   Z01 [id number]: Encounter for other preprocedural examination  Preop total knee arthroplasty  COMPARISON:  None EXAM PERFORMED/VIEWS:  XR CHEST PA & LATERAL  DUAL ENERGY SUBTRACTION TECHNIQUE FINDINGS: Cardiomediastinal silhouette appears unremarkable  The lungs are clear  No pneumothorax or pleural effusion  Osseous structures appear within normal limits for patient age  Impression: No acute cardiopulmonary disease  Workstation performed: [id number]       EKG, Pathology, and Other Studies:   I have personally reviewed pertinent reports  Reviewed patient's cardiology evaluation including of his visit done on 09/17/2019  Portions of the record may have been created with voice recognition software  Occasional wrong word or "sound a like" substitutions may have occurred due to the inherent limitations of voice recognition software  Read the chart carefully and recognize, using context, where substitutions have occurred

## 2019-12-04 NOTE — ANESTHESIA POSTPROCEDURE EVALUATION
Post-Op Assessment Note    CV Status:  Stable    Pain management: adequate     Mental Status:  Alert and awake   Hydration Status:  Euvolemic   PONV Controlled:  Controlled   Airway Patency:  Patent   Post Op Vitals Reviewed: Yes      Staff: Anesthesiologist           /86 (12/04/19 1515)    Temp (!) 97 4 °F (36 3 °C) (12/04/19 1515)    Pulse 86 (12/04/19 1515)   Resp 16 (12/04/19 1515)    SpO2 98 % (12/04/19 1515)

## 2019-12-04 NOTE — INTERVAL H&P NOTE
H&P reviewed  After examining the patient I find no changes in the patients condition since the H&P had been written      Vitals:    11/06/19 1425   BP: 102/60   Pulse: 83   Resp: 18   Temp: 97 8 °F (36 6 °C)   SpO2: 99%

## 2019-12-05 LAB
ANION GAP SERPL CALCULATED.3IONS-SCNC: 9 MMOL/L (ref 4–13)
BUN SERPL-MCNC: 10 MG/DL (ref 5–25)
CALCIUM SERPL-MCNC: 8.1 MG/DL (ref 8.3–10.1)
CHLORIDE SERPL-SCNC: 101 MMOL/L (ref 100–108)
CO2 SERPL-SCNC: 26 MMOL/L (ref 21–32)
CREAT SERPL-MCNC: 0.84 MG/DL (ref 0.6–1.3)
ERYTHROCYTE [DISTWIDTH] IN BLOOD BY AUTOMATED COUNT: 13.2 % (ref 11.6–15.1)
GFR SERPL CREATININE-BSD FRML MDRD: 91 ML/MIN/1.73SQ M
GLUCOSE SERPL-MCNC: 108 MG/DL (ref 65–140)
HCT VFR BLD AUTO: 35 % (ref 36.5–49.3)
HGB BLD-MCNC: 11.7 G/DL (ref 12–17)
MCH RBC QN AUTO: 31.9 PG (ref 26.8–34.3)
MCHC RBC AUTO-ENTMCNC: 33.4 G/DL (ref 31.4–37.4)
MCV RBC AUTO: 95 FL (ref 82–98)
PLATELET # BLD AUTO: 232 THOUSANDS/UL (ref 149–390)
PMV BLD AUTO: 9.3 FL (ref 8.9–12.7)
POTASSIUM SERPL-SCNC: 3.8 MMOL/L (ref 3.5–5.3)
RBC # BLD AUTO: 3.67 MILLION/UL (ref 3.88–5.62)
SODIUM SERPL-SCNC: 136 MMOL/L (ref 136–145)
WBC # BLD AUTO: 10.29 THOUSAND/UL (ref 4.31–10.16)

## 2019-12-05 PROCEDURE — 80048 BASIC METABOLIC PNL TOTAL CA: CPT | Performed by: PHYSICIAN ASSISTANT

## 2019-12-05 PROCEDURE — 97116 GAIT TRAINING THERAPY: CPT

## 2019-12-05 PROCEDURE — 97110 THERAPEUTIC EXERCISES: CPT

## 2019-12-05 PROCEDURE — 85027 COMPLETE CBC AUTOMATED: CPT | Performed by: PHYSICIAN ASSISTANT

## 2019-12-05 PROCEDURE — 97530 THERAPEUTIC ACTIVITIES: CPT

## 2019-12-05 RX ORDER — PANTOPRAZOLE SODIUM 40 MG/1
40 TABLET, DELAYED RELEASE ORAL
Status: DISCONTINUED | OUTPATIENT
Start: 2019-12-05 | End: 2019-12-06 | Stop reason: HOSPADM

## 2019-12-05 RX ORDER — CELECOXIB 200 MG/1
200 CAPSULE ORAL DAILY
Status: DISCONTINUED | OUTPATIENT
Start: 2019-12-05 | End: 2019-12-06 | Stop reason: HOSPADM

## 2019-12-05 RX ADMIN — PANTOPRAZOLE SODIUM 40 MG: 40 TABLET, DELAYED RELEASE ORAL at 22:09

## 2019-12-05 RX ADMIN — DOCUSATE SODIUM 100 MG: 100 CAPSULE, LIQUID FILLED ORAL at 17:23

## 2019-12-05 RX ADMIN — OXYCODONE HYDROCHLORIDE 10 MG: 5 TABLET ORAL at 22:09

## 2019-12-05 RX ADMIN — FERROUS SULFATE TAB 325 MG (65 MG ELEMENTAL FE) 325 MG: 325 (65 FE) TAB at 08:21

## 2019-12-05 RX ADMIN — ASPIRIN 325 MG ORAL TABLET 325 MG: 325 PILL ORAL at 08:21

## 2019-12-05 RX ADMIN — CEFAZOLIN SODIUM 1000 MG: 1 SOLUTION INTRAVENOUS at 03:06

## 2019-12-05 RX ADMIN — CELECOXIB 200 MG: 200 CAPSULE ORAL at 08:34

## 2019-12-05 RX ADMIN — EZETIMIBE: 10 TABLET ORAL at 22:09

## 2019-12-05 RX ADMIN — SENNOSIDES 8.6 MG: 8.6 TABLET, FILM COATED ORAL at 08:21

## 2019-12-05 RX ADMIN — HYDRALAZINE HYDROCHLORIDE 5 MG: 20 INJECTION INTRAMUSCULAR; INTRAVENOUS at 11:57

## 2019-12-05 RX ADMIN — DOCUSATE SODIUM 100 MG: 100 CAPSULE, LIQUID FILLED ORAL at 08:21

## 2019-12-05 RX ADMIN — OXYCODONE HYDROCHLORIDE 10 MG: 5 TABLET ORAL at 10:59

## 2019-12-05 RX ADMIN — KETOROLAC TROMETHAMINE 15 MG: 30 INJECTION, SOLUTION INTRAMUSCULAR at 01:29

## 2019-12-05 RX ADMIN — HYDROMORPHONE HYDROCHLORIDE 0.5 MG: 1 INJECTION, SOLUTION INTRAMUSCULAR; INTRAVENOUS; SUBCUTANEOUS at 08:21

## 2019-12-05 RX ADMIN — OXYCODONE HYDROCHLORIDE 10 MG: 5 TABLET ORAL at 16:04

## 2019-12-05 RX ADMIN — OXYCODONE HYDROCHLORIDE 10 MG: 5 TABLET ORAL at 05:41

## 2019-12-05 RX ADMIN — ASPIRIN 325 MG ORAL TABLET 325 MG: 325 PILL ORAL at 17:23

## 2019-12-05 RX ADMIN — LISINOPRIL 40 MG: 20 TABLET ORAL at 08:21

## 2019-12-05 NOTE — PLAN OF CARE
Problem: PHYSICAL THERAPY ADULT  Goal: Performs mobility at highest level of function for planned discharge setting  See evaluation for individualized goals  Description  Treatment/Interventions: Functional transfer training, LE strengthening/ROM, Elevations, Therapeutic exercise, Endurance training, Patient/family training, Equipment eval/education, Gait training, Bed mobility, Compensatory technique education, Spoke to nursing          See flowsheet documentation for full assessment, interventions and recommendations  12/5/2019 1545 by Dario Durham PTA  Outcome: Progressing  Note:   Prognosis: Good  Problem List: Decreased strength, Decreased range of motion, Decreased endurance, Impaired balance, Decreased mobility, Decreased skin integrity, Pain, Orthopedic restrictions  Assessment: Pt  supine in bed upon my arrival  Pt  reporting fatigue, however agreeable to therapeutic intervention  BP measured supine at 167/96  Performance of HEP supine in bed with cues provided for proper completion  Progressed with transfers being able to complete practicing proper technique with no noted LOB  Taken to steps via chair transport  Progressed to stair training, being able to complete practicing proper technique with no noted LOB  Pt  continued with an increased amb  trial with eventual return to room  Pt  repositioned supine in bed with ice applied at end of treatment session  RN aware of pt's BP at end of treatment session  PT will continue to recommend d/c home with family support and HHPT provided when medically stable  Barriers to Discharge: None  Barriers to Discharge Comments: HUGO  Recommendation: Home PT, Home with family support     PT - OK to Discharge: Yes(if d/c when medically stable )    See flowsheet documentation for full assessment       12/5/2019 1228 by Dario Durham PTA  Outcome: Progressing  Note:   Prognosis: Good  Problem List: Decreased range of motion, Decreased strength, Decreased endurance, Decreased mobility, Impaired balance, Decreased skin integrity, Orthopedic restrictions, Pain  Assessment: Pt  supine in bed upon my arrival  Pt  reporting pain/fatigue, however agreeable to therapeutic intervention  Performance of HEP supine in bed with cues provided for proper completion  BP measured supine at 186/67  Progressed with transfers being able to complete practicing proper technique with no noted LOB  BP measured seated at EOB at 185/88  Performance of limited amb  trial due to elevated BP  With use of RW and standbyA of therapist with cues provided for LE sequencing  Able to complete reciprocal gait pattern with amb  trial  Pt  returned to supine in bed with ice applied at end of treatment session  Pt  will continue progression of PT goals with intent of d/c home with HHPT and family support as needed when medically stable  RN aware of BP measurements at end of treatment session  Barriers to Discharge: Inaccessible home environment  Barriers to Discharge Comments: HUGO  Recommendation: Home PT, Home with family support     PT - OK to Discharge: No(Continued progression of PT goals prior to d/c )    See flowsheet documentation for full assessment

## 2019-12-05 NOTE — PROGRESS NOTES
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  8  Ambulatory dysfunction      Recommendation:    Patient is generally recuperating very well, continues to use analgesics p r n  To elevate his symptoms  He is likely to be discharged tomorrow  He has been are us to use ice pack on the operated left knee to minimize any swelling  He did receive p r n  Hydralazine as his blood pressure was elevated 1 time  We discussed use of beta-blockers which she had used in the past following his episode of myocarditis but that was discontinued by his Cardiology  Patient's wife who was present indicates that generally his pressure runs very good and then not very keen on adding any new medicine at present  They have agreed that if his pressure still continues to persistently remain elevated requiring IV antihypertensives then it would be prudent to consider adding beta blockers to his Ace inhibitors  Continue DVT prophylaxis incentive spirometry full fall precautions  May take proton pump inhibitors for his current symptoms  Recommend low-sodium diet  Subjective:     Patient seen and examined today  EMR and overnight events reviewed  Patient is resting comfortably and his wife was in the room  He had mild pain in his operated left knee  He was not using ice packs around the surgical site  Denies any bleeding  Denies any chest pain palpitation diaphoresis  Denies any urinary complaints  Noted and discussed with the nursing staff that his pressure was slightly elevated requiring IV hydralazine 1 time  Denies any associated headache paresthesias or tingling  Review of Systems     Constitutional: No chills, diaphoresis, fatigue or fever  HEENT: No sore throat  No visual complaints  Respiratory: No chest tightness, shortness of breath and wheezing  Cardiovascular: No chest pain and palpitations  No Syncope or grey out  GI: Negative for abdominal distention, pain, constipation, diarrhea or nausea  Endocrine: Negative for cold or heat intolerance, polydipsia and polyuria  Genitourinary: Negative for decreased urine volume, dysuria, flank pain and urgency  Skin: Negative for rash  Neurological: Negative for dizziness, weakness, numbness and headaches  Hematological: Negative for spontaneous bruising or bleeding  Psychiatric: Negative for confusion, dysphoric mood, hallucinations  All other systems reviewed and are negative  OBJECTIVE:     Vitals:     Temp:  [99 4 °F (37 4 °C)-100 8 °F (38 2 °C)] 99 7 °F (37 6 °C)  HR:  [] 108  Resp:  [16-20] 19  BP: (132-185)/(71-98) 152/91  SpO2:  [95 %-97 %] 97 %  Temp (24hrs), Av 9 °F (37 7 °C), Min:99 4 °F (37 4 °C), Max:100 8 °F (38 2 °C)  Current: Temperature: 99 7 °F (37 6 °C)    Intake/Output Summary (Last 24 hours) at 2019 1852  Last data filed at 2019 1210  Gross per 24 hour   Intake    Output 3475 ml   Net -3475 ml     Body mass index is 28 82 kg/m²  Physical Exam    Awake and alert cooperative  Pallor JVP cyanosis negative  Bilateral lungs are clear no rales or crackles  S1-S2 regular no gallops no murmurs  No pericardial rub  Abdomen is soft without tenderness or guarding  Neurological intact good hand grasp and plantar flexion  Operated left knee well-approximated surgical scar  Slight edema and warm to touch particularly on the medial aspect    Patient not using ice pack as has been recommended  Bilateral Homans signs are negative  Distal pulses are intact in posterior tibialis dorsalis pedis  Skin dry warm no rashes  Mood fair and stable      Labs, Imaging, & Other studies:    All pertinent labs and imaging studies were personally reviewed  Results from last 7 days   Lab Units 12/05/19  0428   WBC Thousand/uL 10 29*   HEMOGLOBIN g/dL 11 7*   PLATELETS Thousands/uL 232     Results from last 7 days   Lab Units 12/05/19  0428   POTASSIUM mmol/L 3 8   CHLORIDE mmol/L 101   CO2 mmol/L 26   BUN mg/dL 10   CREATININE mg/dL 0 84   EGFR ml/min/1 73sq m 91   CALCIUM mg/dL 8 1*           Current Meds:  Current Facility-Administered Medications   Medication Dose Route Frequency Provider Last Rate Last Dose    acetaminophen (TYLENOL) tablet 650 mg  650 mg Oral Q6H PRN LAURA Wang-ARGELIA        aspirin tablet 325 mg  325 mg Oral BID LAURA Wang-C   325 mg at 12/05/19 1723    celecoxib (CeleBREX) capsule 200 mg  200 mg Oral Daily ANN WangC   200 mg at 12/05/19 0095    docusate sodium (COLACE) capsule 100 mg  100 mg Oral BID ANN WangC   100 mg at 12/05/19 1723    ezetimibe 10 mg-pravastatin 40 mg combo dose   Oral HS Kirti Iverson MD        ferrous sulfate tablet 325 mg  325 mg Oral Daily With Breakfast ANN WangC   325 mg at 12/05/19 6120    hydrALAZINE (APRESOLINE) injection 5 mg  5 mg Intravenous Q6H PRN Kirti Iverson MD   5 mg at 12/05/19 1157    HYDROmorphone (DILAUDID) injection 0 5 mg  0 5 mg Intravenous Q2H PRN Elijah Arreaga PA-C   0 5 mg at 12/05/19 0821    ketorolac (TORADOL) injection 15 mg  15 mg Intravenous Q6H PRN LAURA Wang-C   15 mg at 12/05/19 0129    lactated ringers infusion  100 mL/hr Intravenous Continuous ANN WangC 100 mL/hr at 12/04/19 1331 100 mL/hr at 12/04/19 1331    lactated ringers infusion  125 mL/hr Intravenous Continuous Litzy Angela DO   Stopped at 12/04/19 1139    lisinopril (ZESTRIL) tablet 40 mg  40 mg Oral Daily Kirti Iverson MD   40 mg at 12/05/19 0821    ondansetron (ZOFRAN) injection 4 mg  4 mg Intravenous Q8H PRN Dellie Warren, PA-C        oxyCODONE (ROXICODONE) IR tablet 10 mg  10 mg Oral Q4H PRN Dellie Warren, PA-C   10 mg at 12/05/19 1604    oxyCODONE (ROXICODONE) IR tablet 5 mg  5 mg Oral Q4H PRN Dellie Warren, PA-C        senna (SENOKOT) tablet 8 6 mg  1 tablet Oral Daily Jw , PA-C   8 6 mg at 12/05/19 8283     Home Meds:  Medications Prior to Admission   Medication    aspirin (ECOTRIN LOW STRENGTH) 81 mg EC tablet    bismuth subsalicylate (PEPTO BISMOL) 524 mg/30 mL oral suspension    ezetimibe-simvastatin (VYTORIN) 10-20 mg per tablet    lisinopril (ZESTRIL) 40 mg tablet    Multiple Vitamin (MULTIVITAMIN) capsule    oxyCODONE HCl 5 MG TABA       Continuous Infusions:    lactated ringers 100 mL/hr Last Rate: 100 mL/hr (12/04/19 1331)   lactated ringers 125 mL/hr Last Rate: Stopped (12/04/19 1139)       Invasive Devices     Peripheral Intravenous Line            Peripheral IV 12/05/19 Right Forearm less than 1 day                VTE Pharmacologic Prophylaxis: ASA BID as per Primary Ortho Team   VTE Mechanical Prophylaxis: sequential compression device    Portions of the record may have been created with voice recognition software  Occasional wrong word or "sound a like" substitutions may have occurred due to the inherent limitations of voice recognition software  Read the chart carefully and recognize, using context, where substitutions have occurred

## 2019-12-05 NOTE — PROGRESS NOTES
Orthopedic Total Knee Progress Note  ([facility])    ASSESSMENT & PLAN:  Status post- LEFT total knee arthroplasty:  LOS: 0 days    Doing well postoperatively  Pain Relief: Pt comfortable and pain controlled  Continues current post-op course  Activity: up with assistance  Working with PT / OT  Weight Bearing: WBAT      SUBJECTIVE:    Systemic or Specific Complaints: Pain medications covering pain  OBJECTIVE:  Vital signs in last 24 hours:  Temp:  [96 1 °F (35 6 °C)-100 8 °F (38 2 °C)] 99 7 °F (37 6 °C)  HR:  [] 103  Resp:  [15-21] 18  BP: (114-186)/() 172/93  General: alert, appears stated age and cooperative   Neurovascular: Intact    Wound: No Erythema and Wound Intact   Range of Motion: Normal for post surgery   DVT Exam: Negative Phuong's sign  No cords or calf tenderness  No significant calf/ankle edema       Data Review:  CBC:   Lab Results   Component Value Date    WBC 10 29 (H) 12/05/2019    RBC 3 67 (L) 12/05/2019    HGB 11 7 (L) 12/05/2019    HCT 35 0 (L) 12/05/2019     12/05/2019     Plan:  DVT Prophylaxis -  mg BID x 6 weeks  Mobilize with PT / OT  D/C Planning for Disposition  Will add celebrex for pain    Deniz Cuevas PA-C  Date: 12/5/2019  Time: 8:10 AM

## 2019-12-05 NOTE — PHYSICAL THERAPY NOTE
Physical Therapy Progress Note     12/05/19 1213   Pain Assessment   Pain Assessment 0-10   Pain Score 6   Pain Type Surgical pain   Pain Location Knee   Pain Orientation Left   Hospital Pain Intervention(s) Ambulation/increased activity;Repositioned;Cold applied   Response to Interventions Tolerated  Restrictions/Precautions   Weight Bearing Precautions Per Order Yes   LLE Weight Bearing Per Order WBAT   Other Precautions WBS; Fall Risk;Pain   General   Chart Reviewed Yes   Response to Previous Treatment Patient reporting fatigue but able to participate  Family/Caregiver Present No   Subjective   Subjective Willing to participate in therapy this PM    Bed Mobility   Supine to Sit 5  Supervision   Additional items Assist x 1;HOB elevated; Bedrails;Leg ; Increased time required;Verbal cues;LE management   Sit to Supine 5  Supervision   Additional items Assist x 1;Bedrails;Leg ; Increased time required;Verbal cues;LE management   Transfers   Sit to Stand 5  Supervision   Additional items Assist x 1;Bedrails; Increased time required;Verbal cues   Stand to Sit 5  Supervision   Additional items Assist x 1;Bedrails; Increased time required;Verbal cues   Ambulation/Elevation   Gait pattern Decreased foot clearance; Antalgic; Improper Weight shift;Decreased L stance; Short stride; Excessively slow; Inconsistent nichole  (reciprocal gait pattern)   Gait Assistance 5  Supervision   Additional items Assist x 1;Verbal cues; Tactile cues   Assistive Device Rolling walker   Distance 50'   Balance   Static Sitting Fair   Dynamic Sitting Fair   Static Standing Fair -   Dynamic Standing Fair -   Ambulatory Fair -   Endurance Deficit   Endurance Deficit Yes   Endurance Deficit Description medical/pain/fatigue   Activity Tolerance   Activity Tolerance Patient limited by fatigue;Patient limited by pain   Nurse Made Aware Yes   Exercises   TKR Supine;10 reps;AAROM; Bilateral   Assessment   Prognosis Good   Problem List Decreased range of motion;Decreased strength;Decreased endurance;Decreased mobility; Impaired balance;Decreased skin integrity;Orthopedic restrictions;Pain   Assessment Pt  supine in bed upon my arrival  Pt  reporting pain/fatigue, however agreeable to therapeutic intervention  Performance of HEP supine in bed with cues provided for proper completion  BP measured supine at 186/67  Progressed with transfers being able to complete practicing proper technique with no noted LOB  BP measured seated at EOB at 185/88  Performance of limited amb  trial due to elevated BP  With use of RW and standbyA of therapist with cues provided for LE sequencing  Able to complete reciprocal gait pattern with amb  trial  Pt  returned to supine in bed with ice applied at end of treatment session  Pt  will continue progression of PT goals with intent of d/c home with HHPT and family support as needed when medically stable  RN aware of BP measurements at end of treatment session  Barriers to Discharge Inaccessible home environment   Barriers to Discharge Comments HUGO   Goals   Patient Goals To get better  STG Expiration Date 12/07/19   PT Treatment Day 1   Plan   Treatment/Interventions Functional transfer training;LE strengthening/ROM; Therapeutic exercise; Endurance training;Bed mobility;Gait training;Spoke to nursing;Spoke to case management   Progress Progressing toward goals   PT Frequency 7x/wk; Twice a day;Weekend   Recommendation   Recommendation Home PT; Home with family support   Equipment Recommended Other (Comment)  (has DME at home  )   PT - OK to Discharge No  (Continued progression of PT goals prior to d/c )     Maine Cuevas, PTA

## 2019-12-05 NOTE — PHYSICAL THERAPY NOTE
Physical Therapy Progress Note     12/05/19 1517   Pain Assessment   Pain Assessment 0-10   Pain Score 8   Pain Type Surgical pain   Pain Location Knee   Pain Orientation Left   Hospital Pain Intervention(s) Ambulation/increased activity;Repositioned;Cold applied   Response to Interventions Tolerated  Restrictions/Precautions   Weight Bearing Precautions Per Order Yes   LLE Weight Bearing Per Order WBAT   Other Precautions WBS; Fall Risk;Pain   General   Chart Reviewed Yes   Response to Previous Treatment Patient reporting fatigue but able to participate  Family/Caregiver Present No   Subjective   Subjective Willing to participate in therapy this PM    Bed Mobility   Supine to Sit 5  Supervision   Additional items Assist x 1;HOB elevated; Bedrails;Leg ; Increased time required;LE management;Verbal cues   Sit to Supine 5  Supervision   Additional items Assist x 1;Bedrails;Leg ; Increased time required;Verbal cues;LE management   Transfers   Sit to Stand 5  Supervision   Additional items Assist x 1;Bedrails; Increased time required;Verbal cues   Stand to Sit 5  Supervision   Additional items Assist x 1;Bedrails; Increased time required;Verbal cues   Car transfer 5  Supervision   Additional items Assist x 1; Armrests; Increased time required;Verbal cues   Ambulation/Elevation   Gait pattern Decreased foot clearance; Forward Flexion; Short stride; Excessively slow; Inconsistent nichole;Decreased L stance; Antalgic  (reciprocal gait pattern)   Gait Assistance 5  Supervision   Additional items Assist x 1;Verbal cues; Tactile cues   Assistive Device Rolling walker   Distance 100'   Stair Management Assistance 5  Supervision   Additional items Assist x 1; Tactile cues; Verbal cues   Stair Management Technique Two rails; Step to pattern; Foreward;Nonreciprocal   Number of Stairs 3   Balance   Static Sitting Fair   Dynamic Sitting Fair   Static Standing Fair   Dynamic Standing Fair   Ambulatory Fair -   Endurance Deficit Endurance Deficit No   Activity Tolerance   Activity Tolerance Patient tolerated treatment well   Nurse Made Aware Yes   Exercises   TKR Supine;10 reps;AAROM; Bilateral   Assessment   Prognosis Good   Problem List Decreased strength;Decreased range of motion;Decreased endurance; Impaired balance;Decreased mobility; Decreased skin integrity;Pain;Orthopedic restrictions   Assessment Pt  supine in bed upon my arrival  Pt  reporting fatigue, however agreeable to therapeutic intervention  BP measured supine at 167/96  Performance of HEP supine in bed with cues provided for proper completion  Progressed with transfers being able to complete practicing proper technique with no noted LOB  Taken to steps via chair transport  Progressed to stair training, being able to complete practicing proper technique with no noted LOB  Pt  continued with an increased amb  trial with eventual return to room  Pt  repositioned supine in bed with ice applied at end of treatment session  RN aware of pt's BP at end of treatment session  PT will continue to recommend d/c home with family support and HHPT provided when medically stable  Barriers to Discharge None   Goals   Patient Goals To go home  STG Expiration Date 12/07/19   PT Treatment Day 2   Plan   Treatment/Interventions Functional transfer training;LE strengthening/ROM; Therapeutic exercise;Elevations; Endurance training;Gait training;Bed mobility;Spoke to nursing;Spoke to case management   Progress Progressing toward goals   PT Frequency 7x/wk; Twice a day;Weekend   Recommendation   Recommendation Home PT; Home with family support   Equipment Recommended Other (Comment)  (has DME at home  )   PT - OK to Discharge Yes  (if d/c when medically stable )     Maine Cuevas, PTA

## 2019-12-05 NOTE — SOCIAL WORK
CM met with pt at bedside to discuss discharge needs  Pt lives in a ranch style home with his wife  ADL's are completed independently  Pt owns a RW; declined BSC  Pt go home with HHPT; Requests to use [facility]  CM will submit a referral for HHPT/SN  Pt does drive and will have a ride home at D/C  Pt would like to use [facility]  No other needs expressed or identified  CM will continue to follow as needed  A post acute care recommendation was made by your care team for [facility]  Discussed Freedom of Choice with patient  List of agencies given to patient via in person  patient aware the list is custom filtered for them by zip code location and that [facility] post acute providers are designated

## 2019-12-05 NOTE — PLAN OF CARE
Problem: PHYSICAL THERAPY ADULT  Goal: Performs mobility at highest level of function for planned discharge setting  See evaluation for individualized goals  Description  Treatment/Interventions: Functional transfer training, LE strengthening/ROM, Elevations, Therapeutic exercise, Endurance training, Patient/family training, Equipment eval/education, Gait training, Bed mobility, Compensatory technique education, Spoke to nursing          See flowsheet documentation for full assessment, interventions and recommendations  Outcome: Progressing  Note:   Prognosis: Good  Problem List: Decreased range of motion, Decreased strength, Decreased endurance, Decreased mobility, Impaired balance, Decreased skin integrity, Orthopedic restrictions, Pain  Assessment: Pt  supine in bed upon my arrival  Pt  reporting pain/fatigue, however agreeable to therapeutic intervention  Performance of HEP supine in bed with cues provided for proper completion  BP measured supine at 186/67  Progressed with transfers being able to complete practicing proper technique with no noted LOB  BP measured seated at EOB at 185/88  Performance of limited amb  trial due to elevated BP  With use of RW and standbyA of therapist with cues provided for LE sequencing  Able to complete reciprocal gait pattern with amb  trial  Pt  returned to supine in bed with ice applied at end of treatment session  Pt  will continue progression of PT goals with intent of d/c home with HHPT and family support as needed when medically stable  RN aware of BP measurements at end of treatment session  Barriers to Discharge: Inaccessible home environment  Barriers to Discharge Comments: HUGO  Recommendation: Home PT, Home with family support     PT - OK to Discharge: No(Continued progression of PT goals prior to d/c )    See flowsheet documentation for full assessment

## 2019-12-06 VITALS
RESPIRATION RATE: 18 BRPM | HEIGHT: 67 IN | DIASTOLIC BLOOD PRESSURE: 80 MMHG | HEART RATE: 99 BPM | TEMPERATURE: 98.1 F | OXYGEN SATURATION: 98 % | BODY MASS INDEX: 28.88 KG/M2 | WEIGHT: 184 LBS | SYSTOLIC BLOOD PRESSURE: 130 MMHG

## 2019-12-06 LAB
ANION GAP SERPL CALCULATED.3IONS-SCNC: 8 MMOL/L (ref 4–13)
BUN SERPL-MCNC: 7 MG/DL (ref 5–25)
CALCIUM SERPL-MCNC: 8.5 MG/DL (ref 8.3–10.1)
CHLORIDE SERPL-SCNC: 102 MMOL/L (ref 100–108)
CO2 SERPL-SCNC: 28 MMOL/L (ref 21–32)
CREAT SERPL-MCNC: 0.73 MG/DL (ref 0.6–1.3)
GFR SERPL CREATININE-BSD FRML MDRD: 97 ML/MIN/1.73SQ M
GLUCOSE SERPL-MCNC: 124 MG/DL (ref 65–140)
POTASSIUM SERPL-SCNC: 3.5 MMOL/L (ref 3.5–5.3)
SODIUM SERPL-SCNC: 138 MMOL/L (ref 136–145)

## 2019-12-06 PROCEDURE — 80048 BASIC METABOLIC PNL TOTAL CA: CPT | Performed by: PHYSICIAN ASSISTANT

## 2019-12-06 PROCEDURE — 97110 THERAPEUTIC EXERCISES: CPT

## 2019-12-06 PROCEDURE — 97530 THERAPEUTIC ACTIVITIES: CPT

## 2019-12-06 PROCEDURE — 97116 GAIT TRAINING THERAPY: CPT

## 2019-12-06 RX ORDER — OXYCODONE HYDROCHLORIDE 5 MG/1
5 TABLET ORAL EVERY 4 HOURS PRN
Qty: 30 TABLET | Refills: 0
Start: 2019-12-06 | End: 2019-12-06 | Stop reason: HOSPADM

## 2019-12-06 RX ORDER — FERROUS SULFATE 325(65) MG
325 TABLET ORAL
Refills: 0
Start: 2019-12-06

## 2019-12-06 RX ORDER — ACETAMINOPHEN 325 MG/1
650 TABLET ORAL EVERY 6 HOURS PRN
Qty: 30 TABLET | Refills: 0
Start: 2019-12-06

## 2019-12-06 RX ORDER — SENNOSIDES 8.6 MG
1 TABLET ORAL
Qty: 120 EACH | Refills: 0
Start: 2019-12-06

## 2019-12-06 RX ORDER — ASPIRIN 325 MG
325 TABLET ORAL 2 TIMES DAILY
Refills: 0
Start: 2019-12-06

## 2019-12-06 RX ORDER — DOCUSATE SODIUM 100 MG/1
100 CAPSULE, LIQUID FILLED ORAL 2 TIMES DAILY
Qty: 10 CAPSULE | Refills: 0
Start: 2019-12-06

## 2019-12-06 RX ORDER — OXYCODONE HYDROCHLORIDE 10 MG/1
10 TABLET ORAL EVERY 4 HOURS PRN
Qty: 30 TABLET | Refills: 0
Start: 2019-12-06 | End: 2019-12-16

## 2019-12-06 RX ADMIN — LISINOPRIL 40 MG: 20 TABLET ORAL at 09:12

## 2019-12-06 RX ADMIN — OXYCODONE HYDROCHLORIDE 10 MG: 5 TABLET ORAL at 09:16

## 2019-12-06 RX ADMIN — PANTOPRAZOLE SODIUM 40 MG: 40 TABLET, DELAYED RELEASE ORAL at 05:29

## 2019-12-06 RX ADMIN — ASPIRIN 325 MG ORAL TABLET 325 MG: 325 PILL ORAL at 09:11

## 2019-12-06 RX ADMIN — DOCUSATE SODIUM 100 MG: 100 CAPSULE, LIQUID FILLED ORAL at 09:11

## 2019-12-06 RX ADMIN — SENNOSIDES 8.6 MG: 8.6 TABLET, FILM COATED ORAL at 09:11

## 2019-12-06 RX ADMIN — CELECOXIB 200 MG: 200 CAPSULE ORAL at 09:12

## 2019-12-06 RX ADMIN — FERROUS SULFATE TAB 325 MG (65 MG ELEMENTAL FE) 325 MG: 325 (65 FE) TAB at 09:12

## 2019-12-06 NOTE — PHYSICAL THERAPY NOTE
Physical Therapy Progress Note     12/06/19 0910   Pain Assessment   Pain Assessment 0-10   Pain Score 7   Pain Type Surgical pain   Pain Location Knee   Pain Orientation Left   Hospital Pain Intervention(s) Ambulation/increased activity;Repositioned;Cold applied   Response to Interventions Tolerated  Restrictions/Precautions   Weight Bearing Precautions Per Order Yes   LLE Weight Bearing Per Order WBAT   Other Precautions WBS; Fall Risk;Pain   General   Chart Reviewed Yes   Response to Previous Treatment Patient with no complaints from previous session  Family/Caregiver Present No   Subjective   Subjective Willing to participate in therapy this AM    Bed Mobility   Supine to Sit 5  Supervision   Additional items Assist x 1;HOB elevated; Bedrails;Leg ; Increased time required;LE management;Verbal cues   Sit to Supine 5  Supervision   Additional items Assist x 1;Bedrails;Leg ; Increased time required;Verbal cues;LE management   Transfers   Sit to Stand 5  Supervision   Additional items Assist x 1;Bedrails; Increased time required;Verbal cues   Stand to Sit 5  Supervision   Additional items Assist x 1;Bedrails; Increased time required;Verbal cues   Ambulation/Elevation   Gait pattern Decreased foot clearance; Forward Flexion; Short stride; Excessively slow; Inconsistent nichole;Decreased L stance; Antalgic   Gait Assistance 5  Supervision   Additional items Assist x 1;Verbal cues; Tactile cues   Assistive Device Rolling walker   Distance 100'   Balance   Static Sitting Fair   Dynamic Sitting Fair   Static Standing Fair   Dynamic Standing Fair   Ambulatory Fair -   Endurance Deficit   Endurance Deficit No   Activity Tolerance   Activity Tolerance Patient tolerated treatment well   Nurse Made Aware Yes   Exercises   TKR Supine;10 reps;AAROM; Bilateral   Assessment   Prognosis Good   Problem List Decreased strength;Decreased range of motion; Impaired balance;Decreased endurance;Decreased mobility; Decreased skin integrity;Orthopedic restrictions;Pain   Assessment Pt  supine in bed upon my arrival  Pt  reports fatigue, however agreeable to therapeutic intervention  Performance of HEP supine in bed with cues provided for proper completion  Progressed with transfers being able to complete practicing proper technique with no noted LOB  BP measured supine at 129/68  Discussion of stair training, pt  reported good understanding with no questions at this time  Progressed with an increased amb  trial with use of RW and standbyA of therapist with cues provided for LE sequencing  Pt  returned to supine in bed at end of treatment session with ice applied  PT will continue to recommend d/c home with family support and HHPT provided when medically stable  Barriers to Discharge None   Goals   Patient Goals To go home today  STG Expiration Date 12/07/19   PT Treatment Day 3   Plan   Treatment/Interventions Functional transfer training;Elevations;LE strengthening/ROM; Therapeutic exercise; Endurance training;Gait training;Bed mobility;Spoke to case management;Spoke to nursing   Progress Progressing toward goals   PT Frequency 7x/wk; Twice a day;Weekend   Recommendation   Recommendation Home PT; Home with family support   Equipment Recommended Other (Comment)  (has DME at home  )   PT - OK to Discharge Yes  (if d/c when medically stable )     Gregory Livingston, PTA

## 2019-12-06 NOTE — PLAN OF CARE
Problem: PHYSICAL THERAPY ADULT  Goal: Performs mobility at highest level of function for planned discharge setting  See evaluation for individualized goals  Description  Treatment/Interventions: Functional transfer training, LE strengthening/ROM, Elevations, Therapeutic exercise, Endurance training, Patient/family training, Equipment eval/education, Gait training, Bed mobility, Compensatory technique education, Spoke to nursing          See flowsheet documentation for full assessment, interventions and recommendations  Outcome: Progressing  Note:   Prognosis: Good  Problem List: Decreased strength, Decreased range of motion, Impaired balance, Decreased endurance, Decreased mobility, Decreased skin integrity, Orthopedic restrictions, Pain  Assessment: Pt  supine in bed upon my arrival  Pt  reports fatigue, however agreeable to therapeutic intervention  Performance of HEP supine in bed with cues provided for proper completion  Progressed with transfers being able to complete practicing proper technique with no noted LOB  BP measured supine at 129/68  Discussion of stair training, pt  reported good understanding with no questions at this time  Progressed with an increased amb  trial with use of RW and standbyA of therapist with cues provided for LE sequencing  Pt  returned to supine in bed at end of treatment session with ice applied  PT will continue to recommend d/c home with family support and HHPT provided when medically stable  Barriers to Discharge: None  Barriers to Discharge Comments: HUGO  Recommendation: Home PT, Home with family support     PT - OK to Discharge: Yes(if d/c when medically stable )    See flowsheet documentation for full assessment

## 2019-12-06 NOTE — PROGRESS NOTES
Orthopedic Total Knee Progress Note  ([facility])    ASSESSMENT & PLAN:  Status post- LEFT total knee arthroplasty:  POD2  Doing well postoperatively  Pain Relief: Pt comfortable and pain controlled  Continues current post-op course  Activity: up with assistance  Working with PT / OT  Weight Bearing: WBAT      SUBJECTIVE:    Systemic or Specific Complaints: Pain medications covering pain  OBJECTIVE:  Vital signs in last 24 hours:  Temp:  [98 °F (36 7 °C)-99 5 °F (37 5 °C)] 98 °F (36 7 °C)  HR:  [] 94  Resp:  [16-19] 16  BP: (128-185)/(71-98) 163/94  General: alert, appears stated age and cooperative   Neurovascular: Intact    Wound: No Erythema and Wound Intact   Range of Motion: Normal for post surgery   DVT Exam: Negative Phuong's sign  No cords or calf tenderness  No significant calf/ankle edema       Data Review:  CBC:   Lab Results   Component Value Date    WBC 10 29 (H) 12/05/2019    RBC 3 67 (L) 12/05/2019    HGB 11 7 (L) 12/05/2019    HCT 35 0 (L) 12/05/2019     12/05/2019     Plan:  DVT Prophylaxis -  mg BID x 6 weeks  Mobilize with PT / OT  D/C Planning for Disposition    Bijal Mercer MD  Date: 12/6/2019  Time: 7:54 AM

## 2019-12-12 NOTE — DISCHARGE SUMMARY
DISCHARGE SUMMARY  ? Patient Name: Salty Munguia  Patient MRN: [id number]  Admitting Provider: Rozena Cogan, MD  Discharging Provider: No att  providers found  Primary Care Physician at Discharge: Zacarias Rose   Admission Date: 12/4/2019   Discharge Date: 12/6/2019  Admission Diagnosis   Unilateral primary osteoarthritis, left knee [M17 12]  Discharge Diagnoses  Unilateral primary osteoarthritis, left knee [facility] Course    POD #1 Doing well, Working with PT  OOB as tolerates  POD #2 Patient continued with mobilization with PT/OT    Medications  See after visit summary for reconciled discharge medications provided to patient and family  Allergies  No Known Allergies  Outpatient Follow-Up  No future appointments  Discharge Disposition  Stable home  Consults   Medical Management - LAMA  Operative Procedures Performed  Procedure(s):  ARTHROPLASTY KNEE TOTAL      Discharge instructions:  ASA x 6 weeks for DVT prophylaxis   WBAT  Follow up x 3 weeks in the office  Suture ends trimmed POD #10 to skin level  ?   Rozena Cogan, MD

## 2021-03-10 DIAGNOSIS — Z23 ENCOUNTER FOR IMMUNIZATION: ICD-10-CM

## (undated) DEVICE — 3M™ STERI-DRAPE™ U-DRAPE 1015: Brand: STERI-DRAPE™

## (undated) DEVICE — PENCIL ELECTROSURG E-Z CLEAN -0035H

## (undated) DEVICE — ABDOMINAL PAD: Brand: DERMACEA

## (undated) DEVICE — SUT STRATAFIX SPIRAL PDS PLUS 1 CTX 18 IN SXPP1A400

## (undated) DEVICE — FRAZIER SUCTION INSTRUMENT 18 FR W/OBTURATOR, NO CONTROL VENT: Brand: FRAZIER

## (undated) DEVICE — 3M™ IOBAN™ 2 ANTIMICROBIAL INCISE DRAPE 6648EZ: Brand: IOBAN™ 2

## (undated) DEVICE — PREP SURGICAL PURPREP 26ML

## (undated) DEVICE — CUFF TOURNIQUET 30 X 4 IN QUICK CONNECT DISP 1BLA

## (undated) DEVICE — GLOVE INDICATOR PI UNDERGLOVE SZ 8 BLUE

## (undated) DEVICE — SKIN MARKER DUAL TIP WITH RULER CAP, FLEXIBLE RULER AND LABELS: Brand: DEVON

## (undated) DEVICE — CEMENT MIXING BOWL W/SPATULA

## (undated) DEVICE — 3000CC GUARDIAN II: Brand: GUARDIAN

## (undated) DEVICE — SAW BLADE RECIPROCATING 179

## (undated) DEVICE — 3M™ DURAPORE™ SURGICAL TAPE 1538-3, 3 INCH X 10 YARD (7,5CM X 9,1M), 4 ROLLS/BOX: Brand: 3M™ DURAPORE™

## (undated) DEVICE — INTENDED FOR TISSUE SEPARATION, AND OTHER PROCEDURES THAT REQUIRE A SHARP SURGICAL BLADE TO PUNCTURE OR CUT.: Brand: BARD-PARKER ® CARBON RIB-BACK BLADES

## (undated) DEVICE — TRAY FOLEY 16FR URIMETER SURESTEP

## (undated) DEVICE — SCD SEQUENTIAL COMPRESSION COMFORT SLEEVE MEDIUM KNEE LENGTH: Brand: KENDALL SCD

## (undated) DEVICE — SUT VICRYL 1 CTX 36 IN J977H

## (undated) DEVICE — THE SIMPULSE SOLO SYSTEM WITH ULTREX RETRACTABLE SPLASH SHIELD TIP: Brand: SIMPULSE SOLO

## (undated) DEVICE — CHLORAPREP HI-LITE 26ML ORANGE

## (undated) DEVICE — GLOVE INDICATOR PI UNDERGLOVE SZ 7 BLUE

## (undated) DEVICE — HOOD: Brand: FLYTE

## (undated) DEVICE — SPONGE LAP 18 X 18 IN STRL RFD

## (undated) DEVICE — OCCLUSIVE GAUZE STRIP,3% BISMUTH TRIBROMOPHENATE IN PETROLATUM BLEND: Brand: XEROFORM

## (undated) DEVICE — CAPIT KNEE ATTUNE RP CEMENT - DEPUY

## (undated) DEVICE — IMPERVIOUS STOCKINETTE: Brand: DEROYAL

## (undated) DEVICE — GLOVE SRG BIOGEL 8

## (undated) DEVICE — SUT VICRYL 2-0 CT-1 36 IN J945H

## (undated) DEVICE — SUT MONOCRYL 3-0 PS-2 27 IN Y427H

## (undated) DEVICE — COOL TEMP PAD

## (undated) DEVICE — 3M™ STERI-STRIP™ REINFORCED ADHESIVE SKIN CLOSURES, R1547, 1/2 IN X 4 IN (12 MM X 100 MM), 6 STRIPS/ENVELOPE: Brand: 3M™ STERI-STRIP™

## (undated) DEVICE — GLOVE SRG BIOGEL 6.5

## (undated) DEVICE — GAUZE SPONGES,USP TYPE VII GAUZE, 12 PLY: Brand: CURITY

## (undated) DEVICE — WEBRIL 6 IN UNSTERILE

## (undated) DEVICE — BETHLEHEM UNIV TOTAL KNEE, KIT: Brand: CARDINAL HEALTH

## (undated) DEVICE — ACE WRAP 6 IN UNSTERILE

## (undated) DEVICE — PADDING CAST 6IN COTTON STRL

## (undated) DEVICE — SAW BLADE OSCILLATING BRAZOL 167

## (undated) DEVICE — COBAN 6 IN STERILE